# Patient Record
Sex: MALE | Race: WHITE | URBAN - METROPOLITAN AREA
[De-identification: names, ages, dates, MRNs, and addresses within clinical notes are randomized per-mention and may not be internally consistent; named-entity substitution may affect disease eponyms.]

---

## 2024-06-08 ENCOUNTER — INPATIENT (INPATIENT)
Facility: HOSPITAL | Age: 34
LOS: 3 days | Discharge: HOME HEALTH | DRG: 313 | End: 2024-06-12
Attending: ORTHOPAEDIC SURGERY | Admitting: ORTHOPAEDIC SURGERY
Payer: COMMERCIAL

## 2024-06-08 VITALS
HEIGHT: 76 IN | SYSTOLIC BLOOD PRESSURE: 144 MMHG | OXYGEN SATURATION: 99 % | RESPIRATION RATE: 18 BRPM | HEART RATE: 110 BPM | TEMPERATURE: 99 F | DIASTOLIC BLOOD PRESSURE: 74 MMHG | WEIGHT: 279.99 LBS

## 2024-06-08 DIAGNOSIS — Z87.81 PERSONAL HISTORY OF (HEALED) TRAUMATIC FRACTURE: Chronic | ICD-10-CM

## 2024-06-08 LAB
ALBUMIN SERPL ELPH-MCNC: 4.7 G/DL — SIGNIFICANT CHANGE UP (ref 3.5–5.2)
ALP SERPL-CCNC: 97 U/L — SIGNIFICANT CHANGE UP (ref 30–115)
ALT FLD-CCNC: 36 U/L — SIGNIFICANT CHANGE UP (ref 0–41)
ANION GAP SERPL CALC-SCNC: 13 MMOL/L — SIGNIFICANT CHANGE UP (ref 7–14)
APTT BLD: 31.1 SEC — SIGNIFICANT CHANGE UP (ref 27–39.2)
AST SERPL-CCNC: 26 U/L — SIGNIFICANT CHANGE UP (ref 0–41)
BASOPHILS # BLD AUTO: 0.07 K/UL — SIGNIFICANT CHANGE UP (ref 0–0.2)
BASOPHILS NFR BLD AUTO: 0.3 % — SIGNIFICANT CHANGE UP (ref 0–1)
BILIRUB SERPL-MCNC: 0.5 MG/DL — SIGNIFICANT CHANGE UP (ref 0.2–1.2)
BUN SERPL-MCNC: 13 MG/DL — SIGNIFICANT CHANGE UP (ref 10–20)
CALCIUM SERPL-MCNC: 9.3 MG/DL — SIGNIFICANT CHANGE UP (ref 8.4–10.5)
CHLORIDE SERPL-SCNC: 103 MMOL/L — SIGNIFICANT CHANGE UP (ref 98–110)
CO2 SERPL-SCNC: 22 MMOL/L — SIGNIFICANT CHANGE UP (ref 17–32)
CREAT SERPL-MCNC: 0.9 MG/DL — SIGNIFICANT CHANGE UP (ref 0.7–1.5)
EGFR: 114 ML/MIN/1.73M2 — SIGNIFICANT CHANGE UP
EOSINOPHIL # BLD AUTO: 0.02 K/UL — SIGNIFICANT CHANGE UP (ref 0–0.7)
EOSINOPHIL NFR BLD AUTO: 0.1 % — SIGNIFICANT CHANGE UP (ref 0–8)
GLUCOSE SERPL-MCNC: 104 MG/DL — HIGH (ref 70–99)
HCT VFR BLD CALC: 43.1 % — SIGNIFICANT CHANGE UP (ref 42–52)
HGB BLD-MCNC: 14.9 G/DL — SIGNIFICANT CHANGE UP (ref 14–18)
IMM GRANULOCYTES NFR BLD AUTO: 0.5 % — HIGH (ref 0.1–0.3)
INR BLD: 1.07 RATIO — SIGNIFICANT CHANGE UP (ref 0.65–1.3)
LYMPHOCYTES # BLD AUTO: 1.42 K/UL — SIGNIFICANT CHANGE UP (ref 1.2–3.4)
LYMPHOCYTES # BLD AUTO: 7 % — LOW (ref 20.5–51.1)
MCHC RBC-ENTMCNC: 30.5 PG — SIGNIFICANT CHANGE UP (ref 27–31)
MCHC RBC-ENTMCNC: 34.6 G/DL — SIGNIFICANT CHANGE UP (ref 32–37)
MCV RBC AUTO: 88.1 FL — SIGNIFICANT CHANGE UP (ref 80–94)
MONOCYTES # BLD AUTO: 0.94 K/UL — HIGH (ref 0.1–0.6)
MONOCYTES NFR BLD AUTO: 4.6 % — SIGNIFICANT CHANGE UP (ref 1.7–9.3)
NEUTROPHILS # BLD AUTO: 17.81 K/UL — HIGH (ref 1.4–6.5)
NEUTROPHILS NFR BLD AUTO: 87.5 % — HIGH (ref 42.2–75.2)
NRBC # BLD: 0 /100 WBCS — SIGNIFICANT CHANGE UP (ref 0–0)
PLATELET # BLD AUTO: 290 K/UL — SIGNIFICANT CHANGE UP (ref 130–400)
PMV BLD: 9 FL — SIGNIFICANT CHANGE UP (ref 7.4–10.4)
POTASSIUM SERPL-MCNC: 3.8 MMOL/L — SIGNIFICANT CHANGE UP (ref 3.5–5)
POTASSIUM SERPL-SCNC: 3.8 MMOL/L — SIGNIFICANT CHANGE UP (ref 3.5–5)
PROT SERPL-MCNC: 7.5 G/DL — SIGNIFICANT CHANGE UP (ref 6–8)
PROTHROM AB SERPL-ACNC: 12.2 SEC — SIGNIFICANT CHANGE UP (ref 9.95–12.87)
RBC # BLD: 4.89 M/UL — SIGNIFICANT CHANGE UP (ref 4.7–6.1)
RBC # FLD: 12.5 % — SIGNIFICANT CHANGE UP (ref 11.5–14.5)
SODIUM SERPL-SCNC: 138 MMOL/L — SIGNIFICANT CHANGE UP (ref 135–146)
WBC # BLD: 20.37 K/UL — HIGH (ref 4.8–10.8)
WBC # FLD AUTO: 20.37 K/UL — HIGH (ref 4.8–10.8)

## 2024-06-08 PROCEDURE — C1713: CPT

## 2024-06-08 PROCEDURE — 73700 CT LOWER EXTREMITY W/O DYE: CPT | Mod: 26,LT,MC

## 2024-06-08 PROCEDURE — 80048 BASIC METABOLIC PNL TOTAL CA: CPT

## 2024-06-08 PROCEDURE — 85025 COMPLETE CBC W/AUTO DIFF WBC: CPT

## 2024-06-08 PROCEDURE — 73564 X-RAY EXAM KNEE 4 OR MORE: CPT | Mod: 26,LT

## 2024-06-08 PROCEDURE — 99285 EMERGENCY DEPT VISIT HI MDM: CPT

## 2024-06-08 PROCEDURE — 73560 X-RAY EXAM OF KNEE 1 OR 2: CPT | Mod: LT

## 2024-06-08 PROCEDURE — 97162 PT EVAL MOD COMPLEX 30 MIN: CPT | Mod: GP

## 2024-06-08 PROCEDURE — 86901 BLOOD TYPING SEROLOGIC RH(D): CPT

## 2024-06-08 PROCEDURE — 86900 BLOOD TYPING SEROLOGIC ABO: CPT

## 2024-06-08 PROCEDURE — 73552 X-RAY EXAM OF FEMUR 2/>: CPT | Mod: 26,LT

## 2024-06-08 PROCEDURE — 73700 CT LOWER EXTREMITY W/O DYE: CPT | Mod: MC,LT

## 2024-06-08 PROCEDURE — 86850 RBC ANTIBODY SCREEN: CPT

## 2024-06-08 PROCEDURE — 73630 X-RAY EXAM OF FOOT: CPT | Mod: 26,LT

## 2024-06-08 PROCEDURE — 36415 COLL VENOUS BLD VENIPUNCTURE: CPT

## 2024-06-08 PROCEDURE — 71045 X-RAY EXAM CHEST 1 VIEW: CPT | Mod: 26

## 2024-06-08 PROCEDURE — 73590 X-RAY EXAM OF LOWER LEG: CPT | Mod: 26,LT

## 2024-06-08 RX ORDER — MORPHINE SULFATE 50 MG/1
4 CAPSULE, EXTENDED RELEASE ORAL EVERY 4 HOURS
Refills: 0 | Status: DISCONTINUED | OUTPATIENT
Start: 2024-06-08 | End: 2024-06-11

## 2024-06-08 RX ORDER — OXYCODONE HYDROCHLORIDE 5 MG/1
5 TABLET ORAL EVERY 6 HOURS
Refills: 0 | Status: DISCONTINUED | OUTPATIENT
Start: 2024-06-08 | End: 2024-06-11

## 2024-06-08 RX ORDER — KETOROLAC TROMETHAMINE 30 MG/ML
30 SYRINGE (ML) INJECTION ONCE
Refills: 0 | Status: DISCONTINUED | OUTPATIENT
Start: 2024-06-08 | End: 2024-06-08

## 2024-06-08 RX ORDER — ACETAMINOPHEN 500 MG
650 TABLET ORAL ONCE
Refills: 0 | Status: COMPLETED | OUTPATIENT
Start: 2024-06-08 | End: 2024-06-08

## 2024-06-08 RX ADMIN — Medication 650 MILLIGRAM(S): at 20:34

## 2024-06-08 RX ADMIN — Medication 30 MILLIGRAM(S): at 23:50

## 2024-06-08 NOTE — ED PROVIDER NOTE - STUDIES
Xray Image(s) - see wet read section for interpretation EKG - see results section for interpretation/Xray Image(s) - see wet read section for interpretation/CT Scan images

## 2024-06-08 NOTE — H&P ADULT - NSHPPHYSICALEXAM_GEN_ALL_CORE
Physical Exam:  General: NAD, Alert, Awake and oriented  Neurologic: No gross deficits, moving all 4s.  Head: NCAT without abrasions, lacerations, or ecchymosis to head, face, or scalp  Respiratory: Unlabored breathing   Abdomen: Soft, Nontender, no guarding.  Musculoskeletal:      PELVIS:No open skin or wounds. Pelvis stable to AP and Lat compression. Able to actively SLR bilaterally.       LLE:   Swelling about the knee  Ecchymosis over the lateral aspect of the tibial plateau   Unable to assess varus/valgus malalignment due to pain   Unable to SLR  SILT DP/SP/T/Leyva/Sa.   EHL/FHL/TA/Gs motor intact.  2+ DP/PT pulses with brisk cap refill distally.  Compartments soft and compressible.   No pain on passive stretch.    HUNG: 0.97
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
## 2024-06-08 NOTE — ED PROVIDER NOTE - PHYSICAL EXAMINATION
CONSTITUTIONAL: in no apparent distress.   HEAD: Normocephalic; atraumatic.   EYES: Pupils are round and reactive, extra-ocular muscles are intact. Eyelids are normal in appearance without swelling or lesions.   ENT: Hearing is intact with good acuity to spoken voice.  Patient is speaking clearly, not muffled and airway is intact.   RESPIRATORY: No signs of respiratory distress. Lung sounds are clear in all lobes bilaterally without rales, rhonchi, or wheezes.  CARDIOVASCULAR: Regular rate and rhythm.   GI: Abdomen is soft, non-tender, and without distention. Bowel sounds are present and normoactive in all four quadrants. No masses are noted.   BACK: No evidence of trauma or deformity. No midline tenderness. Normal ROM.   MS: L lower leg and knee with no obvious deformity, but mild swelling noticed; tender to palpation; compartment is soft; limited ROM; sensory function intact; distal pulse present. Rest of the upper and lower extremities unremarkable. NEURO: A & O x 3. Normal speech. No focal deficit.  PSYCHOLOGICAL: Appropriate mood and affect. Good judgement and insight.
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
## 2024-06-08 NOTE — ED ADULT NURSE NOTE - NSFALLRISKINTERV_ED_ALL_ED
Assistance OOB with selected safe patient handling equipment if applicable/Communicate fall risk and risk factors to all staff, patient, and family/Monitor gait and stability/Provide visual cue: yellow wristband, yellow gown, etc/Reinforce activity limits and safety measures with patient and family/Use of alarms - bed, stretcher, chair and/or video monitoring/Call bell, personal items and telephone in reach/Instruct patient to call for assistance before getting out of bed/chair/stretcher/Non-slip footwear applied when patient is off stretcher/[place] to call system/Physically safe environment - no spills, clutter or unnecessary equipment/Purposeful Proactive Rounding/Room/bathroom lighting operational, light cord in reach

## 2024-06-08 NOTE — ED ADULT NURSE NOTE - HIV OFFER
Opt out High Dose Vitamin A Pregnancy And Lactation Text: High dose vitamin A therapy is contraindicated during pregnancy and breast feeding.
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
## 2024-06-08 NOTE — ED PROVIDER NOTE - PROGRESS NOTE DETAILS
Spoke with Ortho who evaluated patient and request the patient to be admitted to their service.  Also requested a CT of the knee and will follow the results. Pt is aware of the plan and agrees.

## 2024-06-08 NOTE — ED ADULT NURSE NOTE - CAS EDP DISCH DISPOSITION ADMI
Attending Statement:. I saw and evaluated the patient. I discussed the patient's case with the Resident.     Patient was seen via face-to-face    Resident precepted via face-to-face    Alma Mckenzie MD     [facility]

## 2024-06-08 NOTE — ED PROVIDER NOTE - EKG/XRAY ADDITIONAL INFORMATION
independent interpretation of the ekg performed by Dr. Jose Stroud shows Normal sinus rhythm heart rate 91 MS interval 134 QRS 92 QTc 447 no elevations or depressions
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
## 2024-06-08 NOTE — ED PROVIDER NOTE - RHYTHM STRIP/ULTRASOUND IMAGES/CT SCAN IMAGES SHOWED
Independent interpretation of the CT scan as a preliminary reading by Dr. Jose Stroud shows left tibeal plateau fx

## 2024-06-09 DIAGNOSIS — S82.209A UNSPECIFIED FRACTURE OF SHAFT OF UNSPECIFIED TIBIA, INITIAL ENCOUNTER FOR CLOSED FRACTURE: ICD-10-CM

## 2024-06-09 LAB
ANION GAP SERPL CALC-SCNC: 13 MMOL/L — SIGNIFICANT CHANGE UP (ref 7–14)
BASOPHILS # BLD AUTO: 0.03 K/UL — SIGNIFICANT CHANGE UP (ref 0–0.2)
BASOPHILS NFR BLD AUTO: 0.3 % — SIGNIFICANT CHANGE UP (ref 0–1)
BUN SERPL-MCNC: 14 MG/DL — SIGNIFICANT CHANGE UP (ref 10–20)
CALCIUM SERPL-MCNC: 8.7 MG/DL — SIGNIFICANT CHANGE UP (ref 8.4–10.5)
CHLORIDE SERPL-SCNC: 104 MMOL/L — SIGNIFICANT CHANGE UP (ref 98–110)
CO2 SERPL-SCNC: 22 MMOL/L — SIGNIFICANT CHANGE UP (ref 17–32)
CREAT SERPL-MCNC: 0.9 MG/DL — SIGNIFICANT CHANGE UP (ref 0.7–1.5)
EGFR: 114 ML/MIN/1.73M2 — SIGNIFICANT CHANGE UP
EOSINOPHIL # BLD AUTO: 0.15 K/UL — SIGNIFICANT CHANGE UP (ref 0–0.7)
EOSINOPHIL NFR BLD AUTO: 1.3 % — SIGNIFICANT CHANGE UP (ref 0–8)
GLUCOSE SERPL-MCNC: 110 MG/DL — HIGH (ref 70–99)
HCT VFR BLD CALC: 38.1 % — LOW (ref 42–52)
HGB BLD-MCNC: 12.8 G/DL — LOW (ref 14–18)
IMM GRANULOCYTES NFR BLD AUTO: 0.4 % — HIGH (ref 0.1–0.3)
LYMPHOCYTES # BLD AUTO: 27 % — SIGNIFICANT CHANGE UP (ref 20.5–51.1)
LYMPHOCYTES # BLD AUTO: 3.09 K/UL — SIGNIFICANT CHANGE UP (ref 1.2–3.4)
MCHC RBC-ENTMCNC: 29.8 PG — SIGNIFICANT CHANGE UP (ref 27–31)
MCHC RBC-ENTMCNC: 33.6 G/DL — SIGNIFICANT CHANGE UP (ref 32–37)
MCV RBC AUTO: 88.8 FL — SIGNIFICANT CHANGE UP (ref 80–94)
MONOCYTES # BLD AUTO: 1.23 K/UL — HIGH (ref 0.1–0.6)
MONOCYTES NFR BLD AUTO: 10.8 % — HIGH (ref 1.7–9.3)
NEUTROPHILS # BLD AUTO: 6.88 K/UL — HIGH (ref 1.4–6.5)
NEUTROPHILS NFR BLD AUTO: 60.2 % — SIGNIFICANT CHANGE UP (ref 42.2–75.2)
NRBC # BLD: 0 /100 WBCS — SIGNIFICANT CHANGE UP (ref 0–0)
PLATELET # BLD AUTO: 269 K/UL — SIGNIFICANT CHANGE UP (ref 130–400)
PMV BLD: 9.5 FL — SIGNIFICANT CHANGE UP (ref 7.4–10.4)
POTASSIUM SERPL-MCNC: 3.6 MMOL/L — SIGNIFICANT CHANGE UP (ref 3.5–5)
POTASSIUM SERPL-SCNC: 3.6 MMOL/L — SIGNIFICANT CHANGE UP (ref 3.5–5)
RBC # BLD: 4.29 M/UL — LOW (ref 4.7–6.1)
RBC # FLD: 12.7 % — SIGNIFICANT CHANGE UP (ref 11.5–14.5)
SODIUM SERPL-SCNC: 139 MMOL/L — SIGNIFICANT CHANGE UP (ref 135–146)
WBC # BLD: 11.43 K/UL — HIGH (ref 4.8–10.8)
WBC # FLD AUTO: 11.43 K/UL — HIGH (ref 4.8–10.8)

## 2024-06-09 RX ORDER — SENNA PLUS 8.6 MG/1
2 TABLET ORAL AT BEDTIME
Refills: 0 | Status: DISCONTINUED | OUTPATIENT
Start: 2024-06-09 | End: 2024-06-11

## 2024-06-09 RX ORDER — ACETAMINOPHEN 500 MG
650 TABLET ORAL EVERY 6 HOURS
Refills: 0 | Status: DISCONTINUED | OUTPATIENT
Start: 2024-06-09 | End: 2024-06-11

## 2024-06-09 RX ORDER — ENOXAPARIN SODIUM 100 MG/ML
40 INJECTION SUBCUTANEOUS EVERY 24 HOURS
Refills: 0 | Status: DISCONTINUED | OUTPATIENT
Start: 2024-06-09 | End: 2024-06-11

## 2024-06-09 RX ORDER — SODIUM CHLORIDE 9 MG/ML
1000 INJECTION, SOLUTION INTRAVENOUS
Refills: 0 | Status: COMPLETED | OUTPATIENT
Start: 2024-06-09 | End: 2024-06-10

## 2024-06-09 RX ORDER — ONDANSETRON 8 MG/1
4 TABLET, FILM COATED ORAL EVERY 6 HOURS
Refills: 0 | Status: DISCONTINUED | OUTPATIENT
Start: 2024-06-09 | End: 2024-06-11

## 2024-06-09 RX ADMIN — OXYCODONE HYDROCHLORIDE 5 MILLIGRAM(S): 5 TABLET ORAL at 22:39

## 2024-06-09 RX ADMIN — ENOXAPARIN SODIUM 40 MILLIGRAM(S): 100 INJECTION SUBCUTANEOUS at 11:58

## 2024-06-09 RX ADMIN — SENNA PLUS 2 TABLET(S): 8.6 TABLET ORAL at 22:09

## 2024-06-09 RX ADMIN — Medication 650 MILLIGRAM(S): at 22:39

## 2024-06-09 RX ADMIN — OXYCODONE HYDROCHLORIDE 5 MILLIGRAM(S): 5 TABLET ORAL at 16:03

## 2024-06-09 RX ADMIN — OXYCODONE HYDROCHLORIDE 5 MILLIGRAM(S): 5 TABLET ORAL at 16:28

## 2024-06-09 RX ADMIN — Medication 650 MILLIGRAM(S): at 22:09

## 2024-06-09 RX ADMIN — Medication 650 MILLIGRAM(S): at 09:59

## 2024-06-09 RX ADMIN — OXYCODONE HYDROCHLORIDE 5 MILLIGRAM(S): 5 TABLET ORAL at 22:09

## 2024-06-09 NOTE — PATIENT PROFILE ADULT - FALL HARM RISK - RISK INTERVENTIONS
Communicate Fall Risk and Risk Factors to all staff, patient, and family/Reinforce activity limits and safety measures with patient and family/Visual Cue: Yellow wristband/Bed in lowest position, wheels locked, appropriate side rails in place/Call bell, personal items and telephone in reach/Instruct patient to call for assistance before getting out of bed or chair/Non-slip footwear when patient is out of bed/[place] to call system/Physically safe environment - no spills, clutter or unnecessary equipment/Purposeful Proactive Rounding/Room/bathroom lighting operational, light cord in reach Assistance OOB with selected safe patient handling equipment/Assistance with ambulation/Communicate Fall Risk and Risk Factors to all staff, patient, and family/Discuss with provider need for PT consult/Monitor gait and stability/Reinforce activity limits and safety measures with patient and family/Visual Cue: Yellow wristband/Bed in lowest position, wheels locked, appropriate side rails in place/Call bell, personal items and telephone in reach/Instruct patient to call for assistance before getting out of bed or chair/Non-slip footwear when patient is out of bed/[place] to call system/Physically safe environment - no spills, clutter or unnecessary equipment/Purposeful Proactive Rounding/Room/bathroom lighting operational, light cord in reach
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
## 2024-06-09 NOTE — PROGRESS NOTE ADULT - SUBJECTIVE AND OBJECTIVE BOX
Orthopaedic Surgery Compartment Check    Patient seen and examined at bedside. Patient is asleep. Patient roused and states he does not have pain.     LLE   Dressing c/d/i.   Firing EHL/FHL/TA/Gastroc  Compartments soft and compressive   Foot WWP       Imaging  No new imaging     33M w/ L. tibial plateau fracture without evidence of compartment syndrome at this time. WIll continue with plan as prior note.    - NWB  - NPO  - Pre-op labs  - q4h compartment checks  - Definitive surgical management pending interdisciplinary discussion and further evaluation.

## 2024-06-10 LAB — BLD GP AB SCN SERPL QL: SIGNIFICANT CHANGE UP

## 2024-06-10 RX ORDER — KETOROLAC TROMETHAMINE 30 MG/ML
15 SYRINGE (ML) INJECTION EVERY 8 HOURS
Refills: 0 | Status: DISCONTINUED | OUTPATIENT
Start: 2024-06-10 | End: 2024-06-11

## 2024-06-10 RX ORDER — FAMOTIDINE 10 MG/ML
20 INJECTION INTRAVENOUS DAILY
Refills: 0 | Status: DISCONTINUED | OUTPATIENT
Start: 2024-06-11 | End: 2024-06-11

## 2024-06-10 RX ORDER — SODIUM CHLORIDE 9 MG/ML
1000 INJECTION INTRAMUSCULAR; INTRAVENOUS; SUBCUTANEOUS
Refills: 0 | Status: DISCONTINUED | OUTPATIENT
Start: 2024-06-10 | End: 2024-06-11

## 2024-06-10 RX ADMIN — MORPHINE SULFATE 4 MILLIGRAM(S): 50 CAPSULE, EXTENDED RELEASE ORAL at 11:54

## 2024-06-10 RX ADMIN — Medication 650 MILLIGRAM(S): at 05:04

## 2024-06-10 RX ADMIN — MORPHINE SULFATE 4 MILLIGRAM(S): 50 CAPSULE, EXTENDED RELEASE ORAL at 04:03

## 2024-06-10 RX ADMIN — SODIUM CHLORIDE 100 MILLILITER(S): 9 INJECTION, SOLUTION INTRAVENOUS at 00:10

## 2024-06-10 RX ADMIN — Medication 15 MILLIGRAM(S): at 21:13

## 2024-06-10 RX ADMIN — Medication 15 MILLIGRAM(S): at 21:20

## 2024-06-10 RX ADMIN — Medication 650 MILLIGRAM(S): at 05:34

## 2024-06-10 RX ADMIN — MORPHINE SULFATE 4 MILLIGRAM(S): 50 CAPSULE, EXTENDED RELEASE ORAL at 03:33

## 2024-06-10 RX ADMIN — ENOXAPARIN SODIUM 40 MILLIGRAM(S): 100 INJECTION SUBCUTANEOUS at 11:54

## 2024-06-10 RX ADMIN — SENNA PLUS 2 TABLET(S): 8.6 TABLET ORAL at 21:12

## 2024-06-10 RX ADMIN — Medication 650 MILLIGRAM(S): at 21:20

## 2024-06-10 RX ADMIN — MORPHINE SULFATE 4 MILLIGRAM(S): 50 CAPSULE, EXTENDED RELEASE ORAL at 14:05

## 2024-06-10 RX ADMIN — Medication 650 MILLIGRAM(S): at 21:13

## 2024-06-10 NOTE — PRE-ANESTHESIA EVALUATION ADULT - NSANTHADDINFOFT_GEN_ALL_CORE
GA planned; Risks discussed including dental injury and more serious complications including cardiac and pulmonary complications and stroke.  Patient expresses understanding with regard to risks of anesthesia and wishes to proceed.    [place]

## 2024-06-10 NOTE — PROGRESS NOTE ADULT - SUBJECTIVE AND OBJECTIVE BOX
s/p left tibial plateau fracture  compartments full but compressible today   added on for ex-fix today   NPO   discussed with patient

## 2024-06-11 PROCEDURE — 73700 CT LOWER EXTREMITY W/O DYE: CPT | Mod: 26,LT

## 2024-06-11 PROCEDURE — 20690 APPL UNIPLN UNI EXT FIXJ SYS: CPT | Mod: LT

## 2024-06-11 PROCEDURE — 20610 DRAIN/INJ JOINT/BURSA W/O US: CPT | Mod: LT

## 2024-06-11 RX ORDER — ENOXAPARIN SODIUM 100 MG/ML
40 INJECTION SUBCUTANEOUS EVERY 24 HOURS
Refills: 0 | Status: DISCONTINUED | OUTPATIENT
Start: 2024-06-12 | End: 2024-06-12

## 2024-06-11 RX ORDER — ACETAMINOPHEN 500 MG
650 TABLET ORAL EVERY 6 HOURS
Refills: 0 | Status: DISCONTINUED | OUTPATIENT
Start: 2024-06-11 | End: 2024-06-12

## 2024-06-11 RX ORDER — POLYETHYLENE GLYCOL 3350 17 G/17G
17 POWDER, FOR SOLUTION ORAL EVERY 12 HOURS
Refills: 0 | Status: DISCONTINUED | OUTPATIENT
Start: 2024-06-11 | End: 2024-06-11

## 2024-06-11 RX ORDER — CEFAZOLIN SODIUM 1 G
2000 VIAL (EA) INJECTION EVERY 8 HOURS
Refills: 0 | Status: COMPLETED | OUTPATIENT
Start: 2024-06-11 | End: 2024-06-12

## 2024-06-11 RX ORDER — SENNA PLUS 8.6 MG/1
2 TABLET ORAL AT BEDTIME
Refills: 0 | Status: DISCONTINUED | OUTPATIENT
Start: 2024-06-11 | End: 2024-06-12

## 2024-06-11 RX ORDER — MORPHINE SULFATE 50 MG/1
4 CAPSULE, EXTENDED RELEASE ORAL EVERY 4 HOURS
Refills: 0 | Status: DISCONTINUED | OUTPATIENT
Start: 2024-06-11 | End: 2024-06-12

## 2024-06-11 RX ORDER — HYDROMORPHONE HYDROCHLORIDE 2 MG/ML
1 INJECTION INTRAMUSCULAR; INTRAVENOUS; SUBCUTANEOUS
Refills: 0 | Status: DISCONTINUED | OUTPATIENT
Start: 2024-06-11 | End: 2024-06-11

## 2024-06-11 RX ORDER — FAMOTIDINE 10 MG/ML
20 INJECTION INTRAVENOUS DAILY
Refills: 0 | Status: DISCONTINUED | OUTPATIENT
Start: 2024-06-11 | End: 2024-06-12

## 2024-06-11 RX ORDER — OXYCODONE HYDROCHLORIDE 5 MG/1
5 TABLET ORAL EVERY 6 HOURS
Refills: 0 | Status: DISCONTINUED | OUTPATIENT
Start: 2024-06-11 | End: 2024-06-12

## 2024-06-11 RX ORDER — POLYETHYLENE GLYCOL 3350 17 G/17G
17 POWDER, FOR SOLUTION ORAL EVERY 12 HOURS
Refills: 0 | Status: DISCONTINUED | OUTPATIENT
Start: 2024-06-11 | End: 2024-06-12

## 2024-06-11 RX ORDER — ONDANSETRON 8 MG/1
4 TABLET, FILM COATED ORAL ONCE
Refills: 0 | Status: DISCONTINUED | OUTPATIENT
Start: 2024-06-11 | End: 2024-06-11

## 2024-06-11 RX ORDER — KETOROLAC TROMETHAMINE 30 MG/ML
15 SYRINGE (ML) INJECTION EVERY 8 HOURS
Refills: 0 | Status: DISCONTINUED | OUTPATIENT
Start: 2024-06-11 | End: 2024-06-12

## 2024-06-11 RX ORDER — ONDANSETRON 8 MG/1
4 TABLET, FILM COATED ORAL EVERY 6 HOURS
Refills: 0 | Status: DISCONTINUED | OUTPATIENT
Start: 2024-06-11 | End: 2024-06-12

## 2024-06-11 RX ORDER — HYDROMORPHONE HYDROCHLORIDE 2 MG/ML
0.5 INJECTION INTRAMUSCULAR; INTRAVENOUS; SUBCUTANEOUS
Refills: 0 | Status: DISCONTINUED | OUTPATIENT
Start: 2024-06-11 | End: 2024-06-11

## 2024-06-11 RX ORDER — SODIUM CHLORIDE 9 MG/ML
1000 INJECTION, SOLUTION INTRAVENOUS
Refills: 0 | Status: DISCONTINUED | OUTPATIENT
Start: 2024-06-11 | End: 2024-06-11

## 2024-06-11 RX ADMIN — FAMOTIDINE 20 MILLIGRAM(S): 10 INJECTION INTRAVENOUS at 11:38

## 2024-06-11 RX ADMIN — ENOXAPARIN SODIUM 40 MILLIGRAM(S): 100 INJECTION SUBCUTANEOUS at 11:38

## 2024-06-11 RX ADMIN — Medication 650 MILLIGRAM(S): at 23:29

## 2024-06-11 RX ADMIN — Medication 650 MILLIGRAM(S): at 19:08

## 2024-06-11 RX ADMIN — Medication 15 MILLIGRAM(S): at 13:39

## 2024-06-11 RX ADMIN — SODIUM CHLORIDE 100 MILLILITER(S): 9 INJECTION, SOLUTION INTRAVENOUS at 19:30

## 2024-06-11 RX ADMIN — Medication 650 MILLIGRAM(S): at 19:30

## 2024-06-11 RX ADMIN — HYDROMORPHONE HYDROCHLORIDE 0.5 MILLIGRAM(S): 2 INJECTION INTRAMUSCULAR; INTRAVENOUS; SUBCUTANEOUS at 19:00

## 2024-06-11 RX ADMIN — SODIUM CHLORIDE 75 MILLILITER(S): 9 INJECTION INTRAMUSCULAR; INTRAVENOUS; SUBCUTANEOUS at 08:44

## 2024-06-11 RX ADMIN — Medication 15 MILLIGRAM(S): at 23:29

## 2024-06-11 RX ADMIN — HYDROMORPHONE HYDROCHLORIDE 0.5 MILLIGRAM(S): 2 INJECTION INTRAMUSCULAR; INTRAVENOUS; SUBCUTANEOUS at 18:47

## 2024-06-11 RX ADMIN — SENNA PLUS 2 TABLET(S): 8.6 TABLET ORAL at 23:29
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
## 2024-06-11 NOTE — BRIEF OPERATIVE NOTE - OPERATION/FINDINGS
displaced bicolumnar tibial plateau fracture Left closed displaced bicondylar tibial plateau fracture
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
## 2024-06-11 NOTE — PRE-OP CHECKLIST - 2.
Patient alert & oriented x4, denies any contacts, jewelry, or undergarments. As per patient, no personal property brought to pre-op area; all belongings left on unit. Patient denies any implants/metals/or other internal prostheses besides LLE screw & immobilizer.

## 2024-06-11 NOTE — BRIEF OPERATIVE NOTE - FIRST ASSIST NAME
Navneet Conti (Resident)
Spine appears normal, range of motion is not limited, no muscle or joint tenderness

## 2024-06-11 NOTE — BRIEF OPERATIVE NOTE - COMMENTS
[facility] 3 external fixation system  5 x 200 mm half pins x 2 (femur)  5 x 180 mm half pins x 2 (tibia)

## 2024-06-11 NOTE — PROGRESS NOTE ADULT - ATTENDING COMMENTS
Seen and examined in PACU  s/p closed reduction of left tibial plateau fracture with knee-spanning external fixation  Resting comfortably  Pain controlled  Left lower extremity: Dressing clean, dry and intact. Leg compartments swollen but compressible. No pain with passive stretch of toes. +TA/GS motor intact. SILT s/t/s/dp/sp. +DP pulse    -NWB left lower extremity in ex-fix, offload heel, elevate/ice  -24 hours of perioperative Ancef  -CT left knee for preoperative planning  -VTE prophylaxis, ASA 81 twice daily on discharge  -PT  -Dispo planning: pending definitive surgical stabilization in likely 1.5-2 weeks pending improvement in soft tissue swelling

## 2024-06-11 NOTE — CHART NOTE - NSCHARTNOTEFT_GEN_A_CORE
PACU ANESTHESIA ADMISSION NOTE      Procedure: Ex-fix left tibia  Post op diagnosis:      ____  Intubated  TV:______       Rate: ______      FiO2: ______    _X___  Patent Airway    _X___  Full return of protective reflexes    ____  Full recovery from anesthesia / back to baseline status    Vitals:  T: 98.4F  HR: 82  BP: 156/82  RR: 18   SpO2: 100%    Mental Status:  __X__ Awake   _____ Alert   _____ Drowsy   _____ Sedated    Nausea/Vomiting:  __X__ NO  ______Yes,   See Post - Op Orders          Pain Scale (0-10):  __0___    Treatment: ____ None    ____ See Post - Op/PCA Orders    Post - Operative Fluids:   ____ Oral   _X___ See Post - Op Orders    Plan: Discharge:   ____Home       _X____Floor     _____Critical Care    _____  Other:_________________    Comments: NO anesthetic related complications noted. Pt. transported to PACU, report endorsed to RN
ORTHOPAEDIC SURGERY COMPARTMENT CHECK NOTE    Patient seen and examined at bedside. Compartments soft and compressible. No pain with passive strength of hallux. Firing EHL/FHL. Sensation intact. Foot wwp.     - Will continue to monitor for compartment syndrome  - Adequate pain control  - Continue with ice + elevation for swelling  - Diet ok, will resume NPO + IVF 6/9/24 0252

## 2024-06-11 NOTE — PRE-OP CHECKLIST - 1.
Called patient to advise her that we will not be able to have our regular nutrition class on 5/2/20. Asked her to please call me at [phone] to set up a phone visit.  
Entered chart to time out patient. Patient speaks English & [place] but prefers consents in [place]

## 2024-06-12 ENCOUNTER — TRANSCRIPTION ENCOUNTER (OUTPATIENT)
Age: 34
End: 2024-06-12

## 2024-06-12 VITALS
DIASTOLIC BLOOD PRESSURE: 72 MMHG | HEART RATE: 77 BPM | RESPIRATION RATE: 18 BRPM | OXYGEN SATURATION: 96 % | SYSTOLIC BLOOD PRESSURE: 119 MMHG | TEMPERATURE: 98 F

## 2024-06-12 PROBLEM — Z00.00 ENCOUNTER FOR PREVENTIVE HEALTH EXAMINATION: Status: ACTIVE | Noted: 2024-06-12

## 2024-06-12 RX ORDER — SENNA PLUS 8.6 MG/1
2 TABLET ORAL
Qty: 30 | Refills: 0
Start: 2024-06-12 | End: 2024-06-26

## 2024-06-12 RX ORDER — TRAMADOL HYDROCHLORIDE 50 MG/1
1 TABLET ORAL
Qty: 20 | Refills: 0
Start: 2024-06-12 | End: 2024-06-16

## 2024-06-12 RX ORDER — ACETAMINOPHEN 500 MG
2 TABLET ORAL
Qty: 80 | Refills: 0
Start: 2024-06-12 | End: 2024-06-21

## 2024-06-12 RX ORDER — PANTOPRAZOLE SODIUM 20 MG/1
1 TABLET, DELAYED RELEASE ORAL
Qty: 30 | Refills: 0
Start: 2024-06-12 | End: 2024-07-11

## 2024-06-12 RX ORDER — TRAMADOL HYDROCHLORIDE 50 MG/1
50 TABLET ORAL EVERY 6 HOURS
Refills: 0 | Status: DISCONTINUED | OUTPATIENT
Start: 2024-06-12 | End: 2024-06-12

## 2024-06-12 RX ORDER — IBUPROFEN 200 MG
1 TABLET ORAL
Qty: 45 | Refills: 0
Start: 2024-06-12 | End: 2024-06-26

## 2024-06-12 RX ORDER — ASPIRIN/CALCIUM CARB/MAGNESIUM 324 MG
1 TABLET ORAL
Qty: 60 | Refills: 0
Start: 2024-06-12 | End: 2024-07-11

## 2024-06-12 RX ADMIN — Medication 100 MILLIGRAM(S): at 00:32

## 2024-06-12 RX ADMIN — POLYETHYLENE GLYCOL 3350 17 GRAM(S): 17 POWDER, FOR SOLUTION ORAL at 05:49

## 2024-06-12 RX ADMIN — Medication 100 MILLIGRAM(S): at 09:04

## 2024-06-12 RX ADMIN — Medication 15 MILLIGRAM(S): at 05:48

## 2024-06-12 RX ADMIN — FAMOTIDINE 20 MILLIGRAM(S): 10 INJECTION INTRAVENOUS at 11:42

## 2024-06-12 RX ADMIN — Medication 650 MILLIGRAM(S): at 05:49

## 2024-06-12 RX ADMIN — ENOXAPARIN SODIUM 40 MILLIGRAM(S): 100 INJECTION SUBCUTANEOUS at 11:42

## 2024-06-12 NOTE — DISCHARGE NOTE PROVIDER - NSDCCPGOAL_GEN_ALL_CORE_FT
To get better and follow your care plan as instructed. PT/OT, NWB LLE, pin care, take aspirin 81 mg bid x 30 days for DVT ppx, take protonix 40 mg daily for GI ppx, take pain meds as prescribed, f/u as OP with dr Li next week To get better and follow your care plan as instructed. PT, NWB LLE, pin care, take aspirin 81 mg bid x 30 days for DVT ppx, take protonix 40 mg daily for GI ppx, take pain meds as prescribed, f/u as OP with dr Li next week

## 2024-06-12 NOTE — DISCHARGE NOTE NURSING/CASE MANAGEMENT/SOCIAL WORK - PATIENT PORTAL LINK FT
You can access the FollowMyHealth Patient Portal offered by [facility] by registering at the following website: [email or web address]. By joining [facility]’s FollowMyHealth portal, you will also be able to view your health information using other applications (apps) compatible with our system.

## 2024-06-12 NOTE — DISCHARGE NOTE PROVIDER - NSDCCPTREATMENT_GEN_ALL_CORE_FT
PRINCIPAL PROCEDURE  Procedure: Application of large external fixation device  Findings and Treatment:

## 2024-06-12 NOTE — PROGRESS NOTE ADULT - SUBJECTIVE AND OBJECTIVE BOX
33 M s/p LLE external fixator placement  for left tibial plateau fx pod 1    MEDICATIONS  (STANDING):  acetaminophen     Tablet .. 650 milliGRAM(s) Oral every 6 hours  ceFAZolin   IVPB 2000 milliGRAM(s) IV Intermittent every 8 hours  enoxaparin Injectable 40 milliGRAM(s) SubCutaneous every 24 hours  famotidine    Tablet 20 milliGRAM(s) Oral daily  ketorolac   Injectable 15 milliGRAM(s) IV Push every 8 hours  polyethylene glycol 3350 17 Gram(s) Oral every 12 hours  senna 2 Tablet(s) Oral at bedtime    MEDICATIONS  (PRN):  ondansetron Injectable 4 milliGRAM(s) IV Push every 6 hours PRN Nausea and/or Vomiting  traMADol 50 milliGRAM(s) Oral every 6 hours PRN Severe Pain (7 - 10)      S&E at bedside Pt resting comfortably. Pain well controlled. Denies f/c/cp/sob/n/v/d    Vital Signs Last 24 Hrs  T(C): 36.9 (12 Jun 2024 04:37), Max: 37 (12 Jun 2024 00:11)  T(F): 98.4 (12 Jun 2024 04:37), Max: 98.6 (12 Jun 2024 00:11)  HR: 87 (12 Jun 2024 04:37) (66 - 91)  BP: 117/64 (12 Jun 2024 04:37) (110/68 - 156/82)  BP(mean): --  RR: 18 (12 Jun 2024 04:37) (14 - 20)  SpO2: 95% (12 Jun 2024 04:37) (95% - 100%)    Parameters below as of 11 Jun 2024 19:30  Patient On (Oxygen Delivery Method): room air      P/E:  NAD, Awake, alert  Nonlabored breathing    LLE  Dressing/Ex-Fix c/d/i  Compartments soft/compressible, no pain w/ passive stretch  SILT sp/dp/t/sural/saph  Firing ta/ehl/fhl/gs  DP pulse 2+, foot wwp          A/P:   33M admitted with Left unstable tibial plateau fracture s/p Ex-Fix on 6/11, doing well postoperatively    Weight bearing: NWB LLE  CT Left Knee done  DVT ppx: lovenox, scds  Postop abx for 24 hrs  pin care  pain control  Incentive spirometer  Diet  Pain control  Home medications  PT/OT/rehab  discharge planning

## 2024-06-12 NOTE — DISCHARGE NOTE PROVIDER - NSDCADMDATE_GEN_ALL_CORE_FT
Headache    Onset: 6 days       Location / description / Associated Symptoms: patient has been having headaches, felt like she had weights on her head and felt like the bed was spinning, vomiting x 5 this morning , sweating, felt like she might pass out, no dizziness currently, walking normally, no fevers, no numbness or weakness         Precipitating Factors: this has happened in the past was evaluated in the ER 10/10/22  Patient state this feel similar but has not been diagnosed with migraines     Pain Scale (1-10), 10 highest: 8/10    What improves/worsens symptoms: laying down     Symptom specific medications: aleve     LMP : Patient's last menstrual period was not addressed     Are you pregnant or breast feeding: no/no     Recent visits (last 3-4 weeks) for same reason or recent surgery: no    New and worsening symptoms discussed which would warrant a return call or immediate evaluation    Patient prefers to be seen in the clinic.     PLAN:  See Provider/Urgent care in next 4 hours    Patient/Caller does not plan to follow recommendations.  Reason for Disposition  • [1] MODERATE headache (e.g., interferes with normal activities) AND [2] present > 24 hours AND [3] unexplained  (Exceptions: analgesics not tried, typical migraine, or headache part of viral illness)    Additional Information  • Negative: [1] Vomiting AND [2] 2 or more times (Exception: similar to previous migraines)     Similar to previous/ 5 times but was all during the same time frame not 5 separate time    Protocols used: HEADACHE-A-    
Regardin/f/il - pt has a headache and head feels heavy, states that in the morning she had vertigo  ----- Message from Kellie Billy sent at 2023  3:40 PM CDT -----  Patient Name: Violeta Walker    Specialist or PCP Name: [facility]    Symptoms: patient has a headache and it feels heavy.  States that in the morning she had vertigo.     Pregnant (females aged 13-60. If Yes, how long?) : no    Call Back # : [phone]    Which State are you currently located in?: [place]    Name of Clinic Site / Acct# : [facility]    Use following scripting for patients waiting for a callback:   \"Nurse callback times vary based on call volumes; please be aware the return phone call may come from an unidentified phone number. If your symptoms worsen or become life threatening while waiting, you should seek immediate assistance by calling 911 or going to the ER for evaluation.\"    
08-Jun-2024 23:56
"Going for procedure to have battery replaced for the vagus nerve stimulator.

## 2024-06-12 NOTE — PHYSICAL THERAPY INITIAL EVALUATION ADULT - NSPTDISCHREC_GEN_A_CORE
I have reviewed the allergy testing/ mixing and or treatment notes, and procedures, I concur with her/his documentation of Shannon Vega.      Miki Arreguin MD  03/28/19  1:44 PM       when appropriate/Outpatient PT

## 2024-06-12 NOTE — PHYSICAL THERAPY INITIAL EVALUATION ADULT - LEVEL OF INDEPENDENCE: STAIR NEGOTIATION, REHAB EVAL
Pt declined stair training; reports he was on crutches before and will have help at home. Does not plan to go in and out of house once home
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
## 2024-06-12 NOTE — DISCHARGE NOTE PROVIDER - HOSPITAL COURSE
33 y o M s/p LLE exfix placement on 6/11/24, pt tolerated procedure well. Pt was given IV abx for infection ppx, pain control meds, DVT/GI ppx. 33 y o M s/p LLE exfix placement on 6/11/24, pt tolerated procedure well. Pt was given IV abx for infection ppx, pain control meds, DVT/GI ppx. Pt worked with PT/OT in hospital, stable for discharge home. Plan for ORIF in 1.5-2 weeks. Pt will be discharged home with home care services, [facility] care, with prescription of aspirin 81 mg bid x 30 days for dvt ppx, protonix 40 mg daily x 30 days for GI ppx. Pt is NWB LLE, using crutches. Pt will f/u with dr Li as OP for surgery scheduling next week.

## 2024-06-12 NOTE — DISCHARGE NOTE PROVIDER - NSDCMRMEDTOKEN_GEN_ALL_CORE_FT
acetaminophen 325 mg oral tablet: 2 tab(s) orally every 6 hours as needed for  moderate pain MDD: 8  aspirin 81 mg oral delayed release tablet: 1 tab(s) orally 2 times a day MDD: 2  Protonix 40 mg oral delayed release tablet: 1 tab(s) orally once a day MDD: 1  senna leaf extract oral tablet: 2 tab(s) orally once a day (at bedtime) as needed for  constipation MDD: 2  traMADol 50 mg oral tablet: 1 tab(s) orally every 6 hours as needed for Severe Pain (7 - 10) MDD: 4   acetaminophen 325 mg oral tablet: 2 tab(s) orally every 6 hours as needed for  moderate pain MDD: 8  aspirin 81 mg oral delayed release tablet: 1 tab(s) orally 2 times a day MDD: 2  ibuprofen 400 mg oral tablet: 1 tab(s) orally 3 times a day as needed for  moderate pain MDD: 3  Protonix 40 mg oral delayed release tablet: 1 tab(s) orally once a day MDD: 1  senna leaf extract oral tablet: 2 tab(s) orally once a day (at bedtime) as needed for  constipation MDD: 2  traMADol 50 mg oral tablet: 1 tab(s) orally every 6 hours as needed for Severe Pain (7 - 10) MDD: 4

## 2024-06-12 NOTE — DISCHARGE NOTE PROVIDER - CARE PROVIDER_API CALL
Luzma Li  Orthopaedic Surgery  [address] [facility] [place] [address]  Phone: [phone]  Fax: [phone]  Follow Up Time:

## 2024-06-12 NOTE — PHYSICAL THERAPY INITIAL EVALUATION ADULT - GENERAL OBSERVATIONS, REHAB EVAL
10:50-11:10; Pt encountered in bathroom with wife; ambulated to bathroom with crutches and APCUM Kathie. Agreeable to PT. Wife assisted with translation.

## 2024-06-18 ENCOUNTER — APPOINTMENT (OUTPATIENT)
Dept: ORTHOPEDIC SURGERY | Facility: CLINIC | Age: 34
End: 2024-06-18
Payer: COMMERCIAL

## 2024-06-18 DIAGNOSIS — S82.142A DISPLACED BICONDYLAR FRACTURE OF LEFT TIBIA, INITIAL ENCOUNTER FOR CLOSED FRACTURE: ICD-10-CM

## 2024-06-18 PROCEDURE — 99024 POSTOP FOLLOW-UP VISIT: CPT

## 2024-06-18 NOTE — REASON FOR VISIT
[FreeTextEntry2] : Injury: Left closed displaced bicondylar tibial plateau fracture Procedure: 6/11/2024 external fixation of left tibial plateau fracture

## 2024-06-18 NOTE — HISTORY OF PRESENT ILLNESS
[de-identified] : The patient is a 33-year-old male with a history of a prior left femur ORIF status post removal of hardware who is now 1 week status post external fixation of a left bicondylar tibial plateau fracture.  He is accompanied today by his wife who assisted with interpretation.  He has been doing well at home.  His pain is well-controlled.  He denies any numbness or tingling.  He has been doing daily pin site care.  He has been elevating the left leg at night when he sleeps.  He has not been icing.  He is taking aspirin 81 twice daily.  No fevers or chills.  No pin site issues.  The patient is well-appearing.  Examination of the left lower extremity demonstrates a knee spanning external fixator in place.  The pin sites are clean and dry with no drainage and no surrounding erythema.  There is moderate swelling around the knee and proximal tibia.  There is no skin wrinkling yet.  There is a small dry eschar over the anterior proximal tibia where he had several small fracture blisters.  The remainder of the skin is intact.  Leg compartments are soft and compressible. +TA/GS/EHL/FHL motor intact. SILT s/t/s/dp/sp. +DP pulse.  Foot is warm and well-perfused with good capillary refill.  No pain with passive stretch of the toes.  No new imaging was obtained today.

## 2024-06-18 NOTE — ASSESSMENT
[FreeTextEntry1] : Assessment: 1 week status post external fixation of left bicondylar tibial plateau fracture.  Continues to have moderate swelling around the proximal tibia without skin wrinkling.  Plan: 1.  Clinical findings were discussed with the patient and his wife. 2.  I discussed the importance of elevation and icing to reduce soft tissue swelling.  I discussed that we will plan for definitive surgical stabilization likely next week pending improvement in soft tissue swelling.  The patient understands and will try to elevate and ice is much as possible.  It is difficult for him to travel here to the office and he will send me clinical photos of his knee this weekend so I can assess improvement in soft tissue swelling. 3.  Continue nonweightbearing to the left lower extremity in the external fixator.  Continue daily pin site care. 4.  Continue aspirin 81 twice daily for VTE prophylaxis 5.  I again discussed the plan for definitive surgical stabilization of his injury with open reduction and internal fixation of the left tibial plateau fracture and removal of the external fixator.  I discussed the risks, benefits, and alternatives of the proposed surgical procedures.  The risks included but were not limited to bleeding, infection, injury to nearby nerve/vessel/structures, malunion, nonunion, posttraumatic arthritis, knee stiffness, implant/fixation failure, functional impairment, wound healing complications, anesthesia related complications, need for reoperation, blood clots, stroke, cardiac arrest, loss of limb, and even death.  I discussed the postoperative recovery including postoperative nonweightbearing restrictions for 10 to 12 weeks.  The patient understood and all questions were answered.  He elected to proceed with the proposed surgical procedures. 6.  We will schedule him for surgery on a mutually agreeable date, tentatively early next week pending improvement in soft tissue swelling.  Plan of care was discussed with the patient and his wife.  They are in agreement and all questions were answered.  I encouraged them to reach out to me directly should any questions or concerns arise prior to his surgery.
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
## 2024-06-19 DIAGNOSIS — S82.142A DISPLACED BICONDYLAR FRACTURE OF LEFT TIBIA, INITIAL ENCOUNTER FOR CLOSED FRACTURE: ICD-10-CM

## 2024-06-25 ENCOUNTER — APPOINTMENT (OUTPATIENT)
Dept: ORTHOPEDIC SURGERY | Facility: HOSPITAL | Age: 34
End: 2024-06-25

## 2024-06-25 ENCOUNTER — INPATIENT (INPATIENT)
Facility: HOSPITAL | Age: 34
LOS: 0 days | Discharge: HOME | DRG: 342 | End: 2024-06-26
Attending: STUDENT IN AN ORGANIZED HEALTH CARE EDUCATION/TRAINING PROGRAM | Admitting: STUDENT IN AN ORGANIZED HEALTH CARE EDUCATION/TRAINING PROGRAM
Payer: COMMERCIAL

## 2024-06-25 VITALS
TEMPERATURE: 98 F | RESPIRATION RATE: 18 BRPM | WEIGHT: 274.92 LBS | HEART RATE: 92 BPM | SYSTOLIC BLOOD PRESSURE: 133 MMHG | HEIGHT: 76 IN | OXYGEN SATURATION: 94 % | DIASTOLIC BLOOD PRESSURE: 68 MMHG

## 2024-06-25 DIAGNOSIS — Z87.81 PERSONAL HISTORY OF (HEALED) TRAUMATIC FRACTURE: Chronic | ICD-10-CM

## 2024-06-25 DIAGNOSIS — S82.142A DISPLACED BICONDYLAR FRACTURE OF LEFT TIBIA, INITIAL ENCOUNTER FOR CLOSED FRACTURE: ICD-10-CM

## 2024-06-25 LAB
ANION GAP SERPL CALC-SCNC: 14 MMOL/L — SIGNIFICANT CHANGE UP (ref 7–14)
BUN SERPL-MCNC: 23 MG/DL — HIGH (ref 10–20)
CALCIUM SERPL-MCNC: 9 MG/DL — SIGNIFICANT CHANGE UP (ref 8.4–10.5)
CHLORIDE SERPL-SCNC: 102 MMOL/L — SIGNIFICANT CHANGE UP (ref 98–110)
CO2 SERPL-SCNC: 21 MMOL/L — SIGNIFICANT CHANGE UP (ref 17–32)
CREAT SERPL-MCNC: 0.9 MG/DL — SIGNIFICANT CHANGE UP (ref 0.7–1.5)
EGFR: 114 ML/MIN/1.73M2 — SIGNIFICANT CHANGE UP
GLUCOSE SERPL-MCNC: 136 MG/DL — HIGH (ref 70–99)
HCT VFR BLD CALC: 36.5 % — LOW (ref 42–52)
HGB BLD-MCNC: 12.3 G/DL — LOW (ref 14–18)
MCHC RBC-ENTMCNC: 30.4 PG — SIGNIFICANT CHANGE UP (ref 27–31)
MCHC RBC-ENTMCNC: 33.7 G/DL — SIGNIFICANT CHANGE UP (ref 32–37)
MCV RBC AUTO: 90.1 FL — SIGNIFICANT CHANGE UP (ref 80–94)
NRBC # BLD: 0 /100 WBCS — SIGNIFICANT CHANGE UP (ref 0–0)
PLATELET # BLD AUTO: 541 K/UL — HIGH (ref 130–400)
PMV BLD: 9.1 FL — SIGNIFICANT CHANGE UP (ref 7.4–10.4)
POTASSIUM SERPL-MCNC: 5.1 MMOL/L — HIGH (ref 3.5–5)
POTASSIUM SERPL-SCNC: 5.1 MMOL/L — HIGH (ref 3.5–5)
RBC # BLD: 4.05 M/UL — LOW (ref 4.7–6.1)
RBC # FLD: 12.7 % — SIGNIFICANT CHANGE UP (ref 11.5–14.5)
SODIUM SERPL-SCNC: 137 MMOL/L — SIGNIFICANT CHANGE UP (ref 135–146)
WBC # BLD: 20.41 K/UL — HIGH (ref 4.8–10.8)
WBC # FLD AUTO: 20.41 K/UL — HIGH (ref 4.8–10.8)

## 2024-06-25 PROCEDURE — 20694 RMVL EXT FIXJ SYS UNDER ANES: CPT | Mod: 58,LT

## 2024-06-25 PROCEDURE — C9399: CPT

## 2024-06-25 PROCEDURE — C1713: CPT

## 2024-06-25 PROCEDURE — 80053 COMPREHEN METABOLIC PANEL: CPT

## 2024-06-25 PROCEDURE — 86901 BLOOD TYPING SEROLOGIC RH(D): CPT

## 2024-06-25 PROCEDURE — 80048 BASIC METABOLIC PNL TOTAL CA: CPT

## 2024-06-25 PROCEDURE — C1769: CPT

## 2024-06-25 PROCEDURE — 73560 X-RAY EXAM OF KNEE 1 OR 2: CPT | Mod: LT

## 2024-06-25 PROCEDURE — 85027 COMPLETE CBC AUTOMATED: CPT

## 2024-06-25 PROCEDURE — 85025 COMPLETE CBC W/AUTO DIFF WBC: CPT

## 2024-06-25 PROCEDURE — 27536 TREAT KNEE FRACTURE: CPT | Mod: 58,LT

## 2024-06-25 PROCEDURE — 86850 RBC ANTIBODY SCREEN: CPT

## 2024-06-25 PROCEDURE — 73560 X-RAY EXAM OF KNEE 1 OR 2: CPT | Mod: 26,LT

## 2024-06-25 PROCEDURE — 86900 BLOOD TYPING SEROLOGIC ABO: CPT

## 2024-06-25 PROCEDURE — 73590 X-RAY EXAM OF LOWER LEG: CPT | Mod: LT

## 2024-06-25 PROCEDURE — 97110 THERAPEUTIC EXERCISES: CPT | Mod: GP

## 2024-06-25 PROCEDURE — 36415 COLL VENOUS BLD VENIPUNCTURE: CPT

## 2024-06-25 RX ORDER — METOCLOPRAMIDE 5 MG/5ML
10 SOLUTION ORAL EVERY 6 HOURS
Refills: 0 | Status: DISCONTINUED | OUTPATIENT
Start: 2024-06-25 | End: 2024-06-26

## 2024-06-25 RX ORDER — CEFAZOLIN 10 G/1
2000 INJECTION, POWDER, FOR SOLUTION INTRAVENOUS EVERY 8 HOURS
Refills: 0 | Status: COMPLETED | OUTPATIENT
Start: 2024-06-25 | End: 2024-06-26

## 2024-06-25 RX ORDER — ONDANSETRON HYDROCHLORIDE 2 MG/ML
4 INJECTION INTRAMUSCULAR; INTRAVENOUS ONCE
Refills: 0 | Status: DISCONTINUED | OUTPATIENT
Start: 2024-06-25 | End: 2024-06-25

## 2024-06-25 RX ORDER — SODIUM CHLORIDE 0.9 % (FLUSH) 0.9 %
1000 SYRINGE (ML) INJECTION
Refills: 0 | Status: DISCONTINUED | OUTPATIENT
Start: 2024-06-25 | End: 2024-06-26

## 2024-06-25 RX ORDER — HYDROMORPHONE HCL 0.2 MG/ML
1 INJECTION, SOLUTION INTRAVENOUS
Refills: 0 | Status: DISCONTINUED | OUTPATIENT
Start: 2024-06-25 | End: 2024-06-25

## 2024-06-25 RX ORDER — DEXTROSE MONOHYDRATE AND SODIUM CHLORIDE 5; .3 G/100ML; G/100ML
1000 INJECTION, SOLUTION INTRAVENOUS
Refills: 0 | Status: DISCONTINUED | OUTPATIENT
Start: 2024-06-25 | End: 2024-06-25

## 2024-06-25 RX ORDER — MORPHINE SULFATE 100 MG/1
4 TABLET, EXTENDED RELEASE ORAL EVERY 4 HOURS
Refills: 0 | Status: DISCONTINUED | OUTPATIENT
Start: 2024-06-25 | End: 2024-06-26

## 2024-06-25 RX ORDER — HYDROMORPHONE HCL 0.2 MG/ML
0.5 INJECTION, SOLUTION INTRAVENOUS
Refills: 0 | Status: DISCONTINUED | OUTPATIENT
Start: 2024-06-25 | End: 2024-06-25

## 2024-06-25 RX ORDER — PANTOPRAZOLE SODIUM 40 MG/10ML
40 INJECTION, POWDER, FOR SOLUTION INTRAVENOUS
Refills: 0 | Status: DISCONTINUED | OUTPATIENT
Start: 2024-06-25 | End: 2024-06-26

## 2024-06-25 RX ORDER — OXYCODONE HYDROCHLORIDE 100 MG/5ML
5 SOLUTION ORAL EVERY 4 HOURS
Refills: 0 | Status: DISCONTINUED | OUTPATIENT
Start: 2024-06-25 | End: 2024-06-26

## 2024-06-25 RX ORDER — KETOROLAC TROMETHAMINE 30 MG/ML
15 INJECTION, SOLUTION INTRAMUSCULAR EVERY 6 HOURS
Refills: 0 | Status: COMPLETED | OUTPATIENT
Start: 2024-06-25 | End: 2024-06-26

## 2024-06-25 RX ORDER — ENOXAPARIN SODIUM 100 MG/ML
40 INJECTION SUBCUTANEOUS EVERY 24 HOURS
Refills: 0 | Status: DISCONTINUED | OUTPATIENT
Start: 2024-06-26 | End: 2024-06-26

## 2024-06-25 RX ORDER — OXYCODONE HYDROCHLORIDE 100 MG/5ML
10 SOLUTION ORAL EVERY 4 HOURS
Refills: 0 | Status: DISCONTINUED | OUTPATIENT
Start: 2024-06-25 | End: 2024-06-26

## 2024-06-25 RX ORDER — ACETAMINOPHEN 325 MG
650 TABLET ORAL EVERY 6 HOURS
Refills: 0 | Status: DISCONTINUED | OUTPATIENT
Start: 2024-06-25 | End: 2024-06-26

## 2024-06-25 RX ORDER — SENNOSIDES 8.6 MG
2 TABLET ORAL AT BEDTIME
Refills: 0 | Status: DISCONTINUED | OUTPATIENT
Start: 2024-06-25 | End: 2024-06-26

## 2024-06-25 RX ADMIN — HYDROMORPHONE HCL 0.5 MILLIGRAM(S): 0.2 INJECTION, SOLUTION INTRAVENOUS at 15:15

## 2024-06-25 RX ADMIN — DEXTROSE MONOHYDRATE AND SODIUM CHLORIDE 75 MILLILITER(S): 5; .3 INJECTION, SOLUTION INTRAVENOUS at 15:15

## 2024-06-25 RX ADMIN — Medication 650 MILLIGRAM(S): at 23:17

## 2024-06-25 RX ADMIN — HYDROMORPHONE HCL 0.5 MILLIGRAM(S): 0.2 INJECTION, SOLUTION INTRAVENOUS at 15:30

## 2024-06-25 RX ADMIN — CEFAZOLIN 100 MILLIGRAM(S): 10 INJECTION, POWDER, FOR SOLUTION INTRAVENOUS at 21:08

## 2024-06-25 RX ADMIN — Medication 2 TABLET(S): at 21:07

## 2024-06-25 RX ADMIN — KETOROLAC TROMETHAMINE 15 MILLIGRAM(S): 30 INJECTION, SOLUTION INTRAMUSCULAR at 23:18

## 2024-06-25 RX ADMIN — OXYCODONE HYDROCHLORIDE 5 MILLIGRAM(S): 100 SOLUTION ORAL at 21:07

## 2024-06-25 RX ADMIN — OXYCODONE HYDROCHLORIDE 5 MILLIGRAM(S): 100 SOLUTION ORAL at 22:00

## 2024-06-25 RX ADMIN — Medication 120 MILLILITER(S): at 18:29

## 2024-06-26 ENCOUNTER — TRANSCRIPTION ENCOUNTER (OUTPATIENT)
Age: 34
End: 2024-06-26

## 2024-06-26 VITALS
RESPIRATION RATE: 18 BRPM | HEART RATE: 89 BPM | OXYGEN SATURATION: 100 % | SYSTOLIC BLOOD PRESSURE: 126 MMHG | TEMPERATURE: 98 F | DIASTOLIC BLOOD PRESSURE: 76 MMHG

## 2024-06-26 LAB
ALBUMIN SERPL ELPH-MCNC: 3.6 G/DL — SIGNIFICANT CHANGE UP (ref 3.5–5.2)
ALP SERPL-CCNC: 89 U/L — SIGNIFICANT CHANGE UP (ref 30–115)
ALT FLD-CCNC: 33 U/L — SIGNIFICANT CHANGE UP (ref 0–41)
ANION GAP SERPL CALC-SCNC: 11 MMOL/L — SIGNIFICANT CHANGE UP (ref 7–14)
AST SERPL-CCNC: 32 U/L — SIGNIFICANT CHANGE UP (ref 0–41)
BASOPHILS # BLD AUTO: 0.03 K/UL — SIGNIFICANT CHANGE UP (ref 0–0.2)
BASOPHILS NFR BLD AUTO: 0.3 % — SIGNIFICANT CHANGE UP (ref 0–1)
BILIRUB SERPL-MCNC: 0.5 MG/DL — SIGNIFICANT CHANGE UP (ref 0.2–1.2)
BUN SERPL-MCNC: 18 MG/DL — SIGNIFICANT CHANGE UP (ref 10–20)
CALCIUM SERPL-MCNC: 8.4 MG/DL — SIGNIFICANT CHANGE UP (ref 8.4–10.5)
CHLORIDE SERPL-SCNC: 104 MMOL/L — SIGNIFICANT CHANGE UP (ref 98–110)
CO2 SERPL-SCNC: 24 MMOL/L — SIGNIFICANT CHANGE UP (ref 17–32)
CREAT SERPL-MCNC: 0.9 MG/DL — SIGNIFICANT CHANGE UP (ref 0.7–1.5)
EGFR: 114 ML/MIN/1.73M2 — SIGNIFICANT CHANGE UP
EOSINOPHIL # BLD AUTO: 0.14 K/UL — SIGNIFICANT CHANGE UP (ref 0–0.7)
EOSINOPHIL NFR BLD AUTO: 1.2 % — SIGNIFICANT CHANGE UP (ref 0–8)
GLUCOSE SERPL-MCNC: 112 MG/DL — HIGH (ref 70–99)
HCT VFR BLD CALC: 29.8 % — LOW (ref 42–52)
HGB BLD-MCNC: 10 G/DL — LOW (ref 14–18)
IMM GRANULOCYTES NFR BLD AUTO: 0.6 % — HIGH (ref 0.1–0.3)
LYMPHOCYTES # BLD AUTO: 1.9 K/UL — SIGNIFICANT CHANGE UP (ref 1.2–3.4)
LYMPHOCYTES # BLD AUTO: 16.9 % — LOW (ref 20.5–51.1)
MCHC RBC-ENTMCNC: 30.4 PG — SIGNIFICANT CHANGE UP (ref 27–31)
MCHC RBC-ENTMCNC: 33.6 G/DL — SIGNIFICANT CHANGE UP (ref 32–37)
MCV RBC AUTO: 90.6 FL — SIGNIFICANT CHANGE UP (ref 80–94)
MONOCYTES # BLD AUTO: 1.15 K/UL — HIGH (ref 0.1–0.6)
MONOCYTES NFR BLD AUTO: 10.2 % — HIGH (ref 1.7–9.3)
NEUTROPHILS # BLD AUTO: 7.95 K/UL — HIGH (ref 1.4–6.5)
NEUTROPHILS NFR BLD AUTO: 70.8 % — SIGNIFICANT CHANGE UP (ref 42.2–75.2)
NRBC # BLD: 0 /100 WBCS — SIGNIFICANT CHANGE UP (ref 0–0)
PLATELET # BLD AUTO: 352 K/UL — SIGNIFICANT CHANGE UP (ref 130–400)
PMV BLD: 9.2 FL — SIGNIFICANT CHANGE UP (ref 7.4–10.4)
POTASSIUM SERPL-MCNC: 4.1 MMOL/L — SIGNIFICANT CHANGE UP (ref 3.5–5)
POTASSIUM SERPL-SCNC: 4.1 MMOL/L — SIGNIFICANT CHANGE UP (ref 3.5–5)
PROT SERPL-MCNC: 5.9 G/DL — LOW (ref 6–8)
RBC # BLD: 3.29 M/UL — LOW (ref 4.7–6.1)
RBC # FLD: 12.9 % — SIGNIFICANT CHANGE UP (ref 11.5–14.5)
SODIUM SERPL-SCNC: 139 MMOL/L — SIGNIFICANT CHANGE UP (ref 135–146)
WBC # BLD: 11.24 K/UL — HIGH (ref 4.8–10.8)
WBC # FLD AUTO: 11.24 K/UL — HIGH (ref 4.8–10.8)

## 2024-06-26 RX ORDER — OXYCODONE HYDROCHLORIDE 100 MG/5ML
1 SOLUTION ORAL
Qty: 28 | Refills: 0
Start: 2024-06-26 | End: 2024-07-02

## 2024-06-26 RX ORDER — SENNOSIDES 8.6 MG
2 TABLET ORAL
Qty: 60 | Refills: 0
Start: 2024-06-26 | End: 2024-07-25

## 2024-06-26 RX ORDER — ASPIRIN 325 MG/1
1 TABLET, FILM COATED ORAL
Qty: 84 | Refills: 0
Start: 2024-06-26 | End: 2024-08-06

## 2024-06-26 RX ORDER — ACETAMINOPHEN 325 MG
2 TABLET ORAL
Qty: 96 | Refills: 0
Start: 2024-06-26 | End: 2024-07-07

## 2024-06-26 RX ORDER — PANTOPRAZOLE SODIUM 40 MG/10ML
1 INJECTION, POWDER, FOR SOLUTION INTRAVENOUS
Qty: 42 | Refills: 0
Start: 2024-06-26 | End: 2024-08-06

## 2024-06-26 RX ADMIN — PANTOPRAZOLE SODIUM 40 MILLIGRAM(S): 40 INJECTION, POWDER, FOR SOLUTION INTRAVENOUS at 05:17

## 2024-06-26 RX ADMIN — KETOROLAC TROMETHAMINE 15 MILLIGRAM(S): 30 INJECTION, SOLUTION INTRAMUSCULAR at 05:40

## 2024-06-26 RX ADMIN — Medication 120 MILLILITER(S): at 03:39

## 2024-06-26 RX ADMIN — ENOXAPARIN SODIUM 40 MILLIGRAM(S): 100 INJECTION SUBCUTANEOUS at 05:16

## 2024-06-26 RX ADMIN — Medication 650 MILLIGRAM(S): at 05:17

## 2024-06-26 RX ADMIN — Medication 650 MILLIGRAM(S): at 11:05

## 2024-06-26 RX ADMIN — Medication 120 MILLILITER(S): at 14:25

## 2024-06-26 RX ADMIN — KETOROLAC TROMETHAMINE 15 MILLIGRAM(S): 30 INJECTION, SOLUTION INTRAMUSCULAR at 00:00

## 2024-06-26 RX ADMIN — KETOROLAC TROMETHAMINE 15 MILLIGRAM(S): 30 INJECTION, SOLUTION INTRAMUSCULAR at 11:05

## 2024-06-26 RX ADMIN — Medication 650 MILLIGRAM(S): at 17:09

## 2024-06-26 RX ADMIN — Medication 650 MILLIGRAM(S): at 00:00

## 2024-06-26 RX ADMIN — Medication 650 MILLIGRAM(S): at 11:35

## 2024-06-26 RX ADMIN — CEFAZOLIN 100 MILLIGRAM(S): 10 INJECTION, POWDER, FOR SOLUTION INTRAVENOUS at 14:24

## 2024-06-26 RX ADMIN — KETOROLAC TROMETHAMINE 15 MILLIGRAM(S): 30 INJECTION, SOLUTION INTRAMUSCULAR at 11:35

## 2024-06-26 RX ADMIN — KETOROLAC TROMETHAMINE 15 MILLIGRAM(S): 30 INJECTION, SOLUTION INTRAMUSCULAR at 05:17

## 2024-06-26 RX ADMIN — Medication 650 MILLIGRAM(S): at 05:40

## 2024-06-26 RX ADMIN — CEFAZOLIN 100 MILLIGRAM(S): 10 INJECTION, POWDER, FOR SOLUTION INTRAVENOUS at 05:16

## 2024-07-02 DIAGNOSIS — F17.210 NICOTINE DEPENDENCE, CIGARETTES, UNCOMPLICATED: ICD-10-CM
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
## 2024-07-02 DIAGNOSIS — S82.142A DISPLACED BICONDYLAR FRACTURE OF LEFT TIBIA, INITIAL ENCOUNTER FOR CLOSED FRACTURE: ICD-10-CM

## 2024-07-02 DIAGNOSIS — Z79.82 LONG TERM (CURRENT) USE OF ASPIRIN: ICD-10-CM
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
## 2024-07-08 ENCOUNTER — APPOINTMENT (OUTPATIENT)
Dept: ORTHOPEDIC SURGERY | Facility: CLINIC | Age: 34
End: 2024-07-08
Payer: COMMERCIAL

## 2024-07-08 PROCEDURE — 73562 X-RAY EXAM OF KNEE 3: CPT | Mod: LT

## 2024-07-08 PROCEDURE — 99024 POSTOP FOLLOW-UP VISIT: CPT

## 2024-07-15 ENCOUNTER — APPOINTMENT (OUTPATIENT)
Dept: ORTHOPEDIC SURGERY | Facility: CLINIC | Age: 34
End: 2024-07-15
Payer: COMMERCIAL

## 2024-07-15 ENCOUNTER — NON-APPOINTMENT (OUTPATIENT)
Age: 34
End: 2024-07-15

## 2024-07-15 DIAGNOSIS — S82.142A DISPLACED BICONDYLAR FRACTURE OF LEFT TIBIA, INITIAL ENCOUNTER FOR CLOSED FRACTURE: ICD-10-CM

## 2024-07-15 PROCEDURE — 73562 X-RAY EXAM OF KNEE 3: CPT | Mod: LT

## 2024-07-15 PROCEDURE — 99024 POSTOP FOLLOW-UP VISIT: CPT

## 2024-08-05 ENCOUNTER — APPOINTMENT (OUTPATIENT)
Dept: ORTHOPEDIC SURGERY | Facility: CLINIC | Age: 34
End: 2024-08-05

## 2024-08-05 PROCEDURE — 99024 POSTOP FOLLOW-UP VISIT: CPT

## 2024-08-05 PROCEDURE — 73562 X-RAY EXAM OF KNEE 3: CPT | Mod: LT

## 2024-08-05 NOTE — ASSESSMENT
[FreeTextEntry1] : Assessment: 6 weeks status post definitive surgical stabilization of a left bicondylar tibial plateau fracture including a tibial tubercle fracture.  Doing well.  Plan: 1.  Clinical and radiographic findings were reviewed with the patient and his wife.  I reviewed today's x-rays with them. 2.  Continue nonweightbearing to the left lower extremity for a total of 10 to 12 weeks.  Continue in the hinged knee brace with progressive knee range of motion as per the rehab protocol. 3.  Continue with physical therapy as scheduled 4.  Complete course of aspirin for VTE prophylaxis tomorrow 5.  We again discussed the importance of smoking cessation and the risk of delayed union/nonunion/wound issues associated with smoking.  He understood. 6.  I would like to see him back for follow-up in about 4 to 5 weeks with repeat x-rays of the left knee.  Anticipate advancing weightbearing at that time.  Plan of care discussed with the patient and his wife.  They are in agreement and all questions were answered.  I encouraged him to reach out to me should any questions or concerns arise prior to his next visit.  X-rays needed at next visit: Left knee

## 2024-08-05 NOTE — HISTORY OF PRESENT ILLNESS
Out of room
[de-identified] : The patient is a 34-year-old male, smoker, who is now approximately 6 weeks status post definitive surgical stabilization of a left bicondylar tibial plateau fracture.  He is accompanied today by his wife who assisted with interpretation.  He has been doing well.  He has maintained nonweightbearing restrictions to the left lower extremity.  He is in a hinged knee brace.  He has started to work with physical therapy and has advanced about 60 degrees of knee flexion.  He has continued to take aspirin 81 twice daily for VTE prophylaxis and is due to complete this tomorrow.  He reports only mild pain over the incision on the medial aspect of the leg.  No fevers or chills.  No wound issues.  He continues to smoke.  The patient is well-appearing.  Examination of the left knee demonstrates well-healed surgical incisions.  The pin sites are all well-healed, dry with no drainage and no surrounding erythema.  He is able to flex the knee to about 70 degrees today.  He is able to actively extend his knee to essentially full left knee extension.  He is able to actively dorsiflex and plantarflex his ankle.  He lacks approximately 5 degrees of neutral ankle dorsiflexion.  Neurovascularly intact distally.  Left knee x-rays taken in the office today were personally reviewed, demonstrating maintained alignment with no interval displacement status post internal fixation of a bicondylar tibial plateau fracture with implants in appropriate position.  Evidence of early interval healing.

## 2024-08-05 NOTE — REASON FOR VISIT
[FreeTextEntry2] : Injury: Left closed displaced bicondylar tibial plateau fracture Procedures: 6/11/2024 external fixation of left tibial plateau fracture. 6/25/2024 removal of external fixator and ORIF of left tibial plateau fracture.

## 2024-09-09 ENCOUNTER — APPOINTMENT (OUTPATIENT)
Dept: ORTHOPEDIC SURGERY | Facility: CLINIC | Age: 34
End: 2024-09-09
Payer: COMMERCIAL

## 2024-09-09 DIAGNOSIS — S82.142A DISPLACED BICONDYLAR FRACTURE OF LEFT TIBIA, INITIAL ENCOUNTER FOR CLOSED FRACTURE: ICD-10-CM

## 2024-09-09 PROCEDURE — 99024 POSTOP FOLLOW-UP VISIT: CPT

## 2024-09-09 PROCEDURE — 73562 X-RAY EXAM OF KNEE 3: CPT | Mod: LT

## 2024-09-09 NOTE — ASSESSMENT
[FreeTextEntry1] : Assessment: 11 weeks status post definitive surgical stabilization of a left bicondylar tibial plateau fracture including a tibial tubercle fracture.  Doing well.  Plan: 1.  Clinical and radiographic findings were reviewed with the patient.  I reviewed today's x-rays with him. 2.  He has started partial weightbearing since about a week ago.  Can advance to weightbearing as tolerated on the left lower extremity with range of motion as tolerated. 3.  I provided him with an updated referral for physical therapy to work on mobilization, strengthening, and continued restoration of knee range of motion 4.  I again discussed the importance of smoking cessation and the risk of delayed union/nonunion associated with smoking with the patient.  He understood. 5.  I would like to see him back for follow-up in about 2 to 3 months with repeat x-rays of the left knee.  At that time, we will also evaluate his right foot and ankle as he reports that he had an old injury there years and continues to have pain from that.  Plan of care discussed with the patient and he is in agreement.  All questions were answered.  X-rays needed at next visit: Left knee, right foot, right ankle

## 2024-09-09 NOTE — HISTORY OF PRESENT ILLNESS
[de-identified] : The patient is a 34-year-old male, smoker, who is now approximately 11 weeks status post definitive surgical stabilization of a left bicondylar tibial plateau fracture.  He has been doing well.  He is continue to do physical therapy 2 times a week.  He is no longer using the hinged knee brace.  He started doing some partial weightbearing approximately 1 week ago.  He reports no pain in his left knee or leg.  No fevers or chills.  No wound issues.  He continues to smoke.  The patient is well-appearing.  Examination of the left knee demonstrates well-healed surgical incisions.  The pin sites are all healed in.  Active knee extension to about 5 to 10 degrees short of full extension.  Knee flexion to about 90 degrees today.  Neurovascularly intact distally.  He can dorsiflex the ankle to close to neutral.  Left knee x-rays taken in the office today were personally reviewed, demonstrating maintained alignment with no interval displacement status post internal fixation of a bicondylar tibial plateau fracture with evidence of interval healing.  Implants in appropriate position.

## 2024-11-04 ENCOUNTER — APPOINTMENT (OUTPATIENT)
Dept: ORTHOPEDIC SURGERY | Facility: CLINIC | Age: 34
End: 2024-11-04

## 2025-09-04 ENCOUNTER — TRANSCRIPTION ENCOUNTER (OUTPATIENT)
Age: 35
End: 2025-09-04